# Patient Record
Sex: FEMALE | Race: OTHER | URBAN - METROPOLITAN AREA
[De-identification: names, ages, dates, MRNs, and addresses within clinical notes are randomized per-mention and may not be internally consistent; named-entity substitution may affect disease eponyms.]

---

## 2023-12-12 ENCOUNTER — APPOINTMENT (RX ONLY)
Dept: URBAN - METROPOLITAN AREA CLINIC 161 | Facility: CLINIC | Age: 38
Setting detail: DERMATOLOGY
End: 2023-12-12

## 2023-12-12 DIAGNOSIS — Z41.9 ENCOUNTER FOR PROCEDURE FOR PURPOSES OTHER THAN REMEDYING HEALTH STATE, UNSPECIFIED: ICD-10-CM

## 2023-12-12 PROCEDURE — ? VI PEEL

## 2023-12-12 NOTE — PROCEDURE: VI PEEL
Treatment Number: 0
Post Peel Care: Went over post care product package with patient in detail. Patient was given products to take home to use.
Consent: Prior to the procedure, written consent was obtained and risks were reviewed, including but not limited to: redness, peeling, blistering, pigmentary change, scarring, infection, and pain. Patient is aware multiple treatments may be necessary to achieve the desired outcome.
Detail Level: Zone
Chemical Peel: VI Peel
Price (Use Numbers Only, No Special Characters Or $): 583
Prep: The treated area was degreased with pre-peel cleanser, and vaseline was applied for protection of mucous membranes.

## 2024-02-13 ENCOUNTER — APPOINTMENT (RX ONLY)
Dept: URBAN - METROPOLITAN AREA CLINIC 161 | Facility: CLINIC | Age: 39
Setting detail: DERMATOLOGY
End: 2024-02-13

## 2024-02-13 DIAGNOSIS — Z41.9 ENCOUNTER FOR PROCEDURE FOR PURPOSES OTHER THAN REMEDYING HEALTH STATE, UNSPECIFIED: ICD-10-CM

## 2024-02-13 PROCEDURE — ? COSMETIC CONSULTATION: AGING FACE

## 2024-02-13 ASSESSMENT — LOCATION DETAILED DESCRIPTION DERM: LOCATION DETAILED: SUPERIOR MID FOREHEAD

## 2024-02-13 ASSESSMENT — LOCATION SIMPLE DESCRIPTION DERM: LOCATION SIMPLE: SUPERIOR FOREHEAD

## 2024-02-13 ASSESSMENT — LOCATION ZONE DERM: LOCATION ZONE: FACE

## 2024-02-20 ENCOUNTER — APPOINTMENT (RX ONLY)
Dept: URBAN - METROPOLITAN AREA CLINIC 161 | Facility: CLINIC | Age: 39
Setting detail: DERMATOLOGY
End: 2024-02-20

## 2024-02-20 DIAGNOSIS — Z41.9 ENCOUNTER FOR PROCEDURE FOR PURPOSES OTHER THAN REMEDYING HEALTH STATE, UNSPECIFIED: ICD-10-CM

## 2024-02-20 PROCEDURE — ? VI PEEL

## 2024-02-20 NOTE — PROCEDURE: VI PEEL
Chemical Peel: VI Peel
Prep: Took photos of patient's face before treatment. Double cleansed, her face and neck. Applied VI Peel wipe followed by four passes of chemical peel on face then one pass on neck.
Treatment Number: 4
Consent: Prior to the procedure, written consent was obtained and risks were reviewed, including but not limited to: redness, peeling, blistering, pigmentary change, scarring, infection, and pain. Patient is aware multiple treatments may be necessary to achieve the desired outcome.
Detail Level: Zone
Post Peel Care: Went over post care product package with patient in detail. Patient was given products to take home to use.
Price (Use Numbers Only, No Special Characters Or $): 0

## 2024-07-16 ENCOUNTER — APPOINTMENT (RX ONLY)
Dept: URBAN - METROPOLITAN AREA CLINIC 161 | Facility: CLINIC | Age: 39
Setting detail: DERMATOLOGY
End: 2024-07-16

## 2024-07-16 DIAGNOSIS — Z41.9 ENCOUNTER FOR PROCEDURE FOR PURPOSES OTHER THAN REMEDYING HEALTH STATE, UNSPECIFIED: ICD-10-CM

## 2024-07-16 PROCEDURE — ? MICRONEEDLING

## 2024-07-16 ASSESSMENT — LOCATION DETAILED DESCRIPTION DERM: LOCATION DETAILED: RIGHT MEDIAL FOREHEAD

## 2024-07-16 ASSESSMENT — LOCATION ZONE DERM: LOCATION ZONE: FACE

## 2024-07-16 ASSESSMENT — LOCATION SIMPLE DESCRIPTION DERM: LOCATION SIMPLE: RIGHT FOREHEAD

## 2024-07-16 NOTE — PROCEDURE: MICRONEEDLING
Post-Care Instructions: After the procedure, take precautions against sun exposure. Do not apply sunscreen for 12 hours after the procedure. Do not apply make-up for 12 hours after the procedure. Avoid alcohol-based toners for 10-14 days. After 2-3 days patients can return to their regular skin regimen.  Only wash your face with water for the next 24 hours. Days 2 through 4 post procedure- Use a gentle cleanser and a zinc oxide sunblock. Avoid exercise for 3 days following the procedure. No sun exposure for 3 days.
Detail Level: Zone
Depth In Mm (Location #5): 0.1
How Many Cc Of Bacteriostatic 0.9% Saline Were Added?: 0
Price (Use Numbers Only, No Special Characters Or $): 946
Consent: Written consent obtained, risks reviewed including but not limited to pain, scarring, infection and incomplete improvement.  Patient understands the procedure is cosmetic in nature and will require out of pocket payment.
Depth In Mm (Location #2): 1.5
Topical Anesthesia?: 23% lidocaine, 7% tetracaine
Depth In Mm (Location #3): 1
Depth In Mm (Location #1): 0.5

## 2024-11-19 ENCOUNTER — APPOINTMENT (RX ONLY)
Dept: URBAN - METROPOLITAN AREA CLINIC 161 | Facility: CLINIC | Age: 39
Setting detail: DERMATOLOGY
End: 2024-11-19

## 2024-11-19 DIAGNOSIS — Z41.9 ENCOUNTER FOR PROCEDURE FOR PURPOSES OTHER THAN REMEDYING HEALTH STATE, UNSPECIFIED: ICD-10-CM

## 2024-11-19 PROCEDURE — ? MICRONEEDLING

## 2024-11-19 ASSESSMENT — LOCATION SIMPLE DESCRIPTION DERM: LOCATION SIMPLE: RIGHT FOREHEAD

## 2024-11-19 ASSESSMENT — LOCATION DETAILED DESCRIPTION DERM: LOCATION DETAILED: RIGHT MEDIAL FOREHEAD

## 2024-11-19 ASSESSMENT — LOCATION ZONE DERM: LOCATION ZONE: FACE

## 2024-11-19 NOTE — PROCEDURE: MICRONEEDLING
Depth In Mm (Location #4): 0.1
Depth In Mm (Location #1): 0.5
Treatment Number (Optional): 0
Detail Level: Zone
Topical Anesthesia?: 23% lidocaine, 7% tetracaine
Consent: Written consent obtained, risks reviewed including but not limited to pain, scarring, infection and incomplete improvement.  Patient understands the procedure is cosmetic in nature and will require out of pocket payment.
Post-Care Instructions: After the procedure, take precautions against sun exposure. Do not apply sunscreen for 12 hours after the procedure. Do not apply make-up for 12 hours after the procedure. Avoid alcohol-based toners for 10-14 days. After 2-3 days patients can return to their regular skin regimen.  Only wash your face with water for the next 24 hours. Days 2 through 4 post procedure- Use a gentle cleanser and a zinc oxide sunblock. Avoid exercise for 3 days following the procedure. No sun exposure for 3 days.
Depth In Mm (Location #2): 1.5
Depth In Mm (Location #3): 1
Price (Use Numbers Only, No Special Characters Or $): 846

## 2025-02-18 ENCOUNTER — APPOINTMENT (OUTPATIENT)
Dept: URBAN - METROPOLITAN AREA CLINIC 161 | Facility: CLINIC | Age: 40
Setting detail: DERMATOLOGY
End: 2025-02-18

## 2025-02-18 DIAGNOSIS — Z41.9 ENCOUNTER FOR PROCEDURE FOR PURPOSES OTHER THAN REMEDYING HEALTH STATE, UNSPECIFIED: ICD-10-CM

## 2025-02-18 PROCEDURE — ? MICRONEEDLING

## 2025-02-18 ASSESSMENT — LOCATION SIMPLE DESCRIPTION DERM: LOCATION SIMPLE: INFERIOR FOREHEAD

## 2025-02-18 ASSESSMENT — LOCATION ZONE DERM: LOCATION ZONE: FACE

## 2025-02-18 ASSESSMENT — LOCATION DETAILED DESCRIPTION DERM: LOCATION DETAILED: INFERIOR MID FOREHEAD

## 2025-02-18 NOTE — PROCEDURE: MICRONEEDLING
Detail Level: Zone
Depth In Mm (Location #3): 1
Post-Care Instructions: After the procedure, take precautions against sun exposure. Do not apply sunscreen for 12 hours after the procedure. Do not apply make-up for 12 hours after the procedure. Avoid alcohol-based toners for 10-14 days. After 2-3 days patients can return to their regular skin regimen.  Only wash your face with water for the next 24 hours. Days 2 through 4 post procedure- Use a gentle cleanser and a zinc oxide sunblock. Avoid exercise for 3 days following the procedure. No sun exposure for 3 days.
Depth In Mm (Location #1): 0.5
Depth In Mm (Location #5): 0.1
Price (Use Numbers Only, No Special Characters Or $): 626
Treatment Number (Optional): 0
Topical Anesthesia?: 23% lidocaine, 7% tetracaine
Consent: Written consent obtained, risks reviewed including but not limited to pain, scarring, infection and incomplete improvement.  Patient understands the procedure is cosmetic in nature and will require out of pocket payment.
Depth In Mm (Location #2): 1.5